# Patient Record
Sex: FEMALE | Race: WHITE | NOT HISPANIC OR LATINO | URBAN - METROPOLITAN AREA
[De-identification: names, ages, dates, MRNs, and addresses within clinical notes are randomized per-mention and may not be internally consistent; named-entity substitution may affect disease eponyms.]

---

## 2024-06-05 ENCOUNTER — TELEPHONE (OUTPATIENT)
Dept: CARDIOLOGY | Facility: HOSPITAL | Age: 84
End: 2024-06-05
Payer: COMMERCIAL

## 2024-06-05 NOTE — TELEPHONE ENCOUNTER
She is scheduled to have Mohs surgery for basal cell carcinoma of her left nose on 07/12/24. She was instructed by her surgeon to discontinue clopidogrel four days prior.  She has shortness of breath while lifting heavy objects that is relieved with laying down or sitting. She is overall feeling good. She does water aerobics and chair exercises for 45 minutes each twice a week without chest pain or shortness of breath. She denies cardiovascular events or hospitalizations since her last appointment. She denies chest pain, palpitations, orthopnea, paroxysmal nocturnal dyspnea, syncope, lower extremity edema, or abnormal bleeding.

## 2024-08-27 ENCOUNTER — OFFICE VISIT (OUTPATIENT)
Dept: CARDIOLOGY | Facility: HOSPITAL | Age: 84
End: 2024-08-27
Payer: COMMERCIAL

## 2024-08-27 VITALS
OXYGEN SATURATION: 97 % | HEIGHT: 60 IN | WEIGHT: 141.5 LBS | HEART RATE: 63 BPM | BODY MASS INDEX: 27.78 KG/M2 | SYSTOLIC BLOOD PRESSURE: 118 MMHG | DIASTOLIC BLOOD PRESSURE: 71 MMHG

## 2024-08-27 DIAGNOSIS — E78.5 HYPERLIPIDEMIA, UNSPECIFIED HYPERLIPIDEMIA TYPE: ICD-10-CM

## 2024-08-27 DIAGNOSIS — I10 PRIMARY HYPERTENSION: Primary | ICD-10-CM

## 2024-08-27 PROCEDURE — 1160F RVW MEDS BY RX/DR IN RCRD: CPT | Performed by: NURSE PRACTITIONER

## 2024-08-27 PROCEDURE — 3078F DIAST BP <80 MM HG: CPT | Performed by: NURSE PRACTITIONER

## 2024-08-27 PROCEDURE — 3074F SYST BP LT 130 MM HG: CPT | Performed by: NURSE PRACTITIONER

## 2024-08-27 PROCEDURE — 93005 ELECTROCARDIOGRAM TRACING: CPT | Performed by: NURSE PRACTITIONER

## 2024-08-27 PROCEDURE — 1036F TOBACCO NON-USER: CPT | Performed by: NURSE PRACTITIONER

## 2024-08-27 PROCEDURE — G2211 COMPLEX E/M VISIT ADD ON: HCPCS | Performed by: NURSE PRACTITIONER

## 2024-08-27 PROCEDURE — 1159F MED LIST DOCD IN RCRD: CPT | Performed by: NURSE PRACTITIONER

## 2024-08-27 PROCEDURE — 99214 OFFICE O/P EST MOD 30 MIN: CPT | Performed by: NURSE PRACTITIONER

## 2024-08-27 RX ORDER — CLOPIDOGREL BISULFATE 75 MG/1
75 TABLET ORAL DAILY
COMMUNITY
Start: 2024-06-08

## 2024-08-27 RX ORDER — ROSUVASTATIN CALCIUM 20 MG/1
20 TABLET, COATED ORAL NIGHTLY
COMMUNITY
Start: 2024-08-16

## 2024-08-27 RX ORDER — ACETAMINOPHEN 500 MG
500 TABLET ORAL EVERY 6 HOURS PRN
COMMUNITY

## 2024-08-27 RX ORDER — FUROSEMIDE 20 MG/1
20 TABLET ORAL DAILY
COMMUNITY
Start: 2024-08-02

## 2024-08-27 RX ORDER — RAMIPRIL 10 MG/1
10 CAPSULE ORAL DAILY
COMMUNITY

## 2024-08-27 RX ORDER — AMLODIPINE BESYLATE 10 MG/1
10 TABLET ORAL DAILY
COMMUNITY
Start: 2024-04-16

## 2024-08-27 RX ORDER — METOPROLOL SUCCINATE 25 MG/1
25 TABLET, EXTENDED RELEASE ORAL 2 TIMES DAILY
COMMUNITY
Start: 2024-08-02

## 2024-08-27 ASSESSMENT — PATIENT HEALTH QUESTIONNAIRE - PHQ9
1. LITTLE INTEREST OR PLEASURE IN DOING THINGS: NOT AT ALL
SUM OF ALL RESPONSES TO PHQ9 QUESTIONS 1 AND 2: 0
2. FEELING DOWN, DEPRESSED OR HOPELESS: NOT AT ALL

## 2024-08-27 ASSESSMENT — ENCOUNTER SYMPTOMS
OCCASIONAL FEELINGS OF UNSTEADINESS: 1
DEPRESSION: 0
LOSS OF SENSATION IN FEET: 0

## 2024-08-27 NOTE — PROGRESS NOTES
Primary Care Physician: Jose Thomson MD  Date of Visit: 08/27/2024  3:30 PM EDT  Location of visit: [facility]     Chief Complaint:   No chief complaint on file.       HPI / Summary:   Lili Dailey is a 83 y.o. female presents for followup. Seen in collaboration with Dr. Alonso. he has no cardiac complaints. She does water aerobics twice a week in addition to chair exercises twice a week without chest pain or shortness of breath. She has chronic lower extremity edema with prolonged standing that is unchanged. She has not had any falls. She ambulates with assistance of a walker.  The patient denies chest pain, shortness of breath, palpitations, lightheadedness, syncope, orthopnea, paroxysmal nocturnal dyspnea, or bleeding problems.              Past Medical History:  Past Medical History:   Diagnosis Date    Central retinal vein occlusion, unspecified eye, stable (CMS-HCC)     Retinal vein occlusion        Past Surgical History:  Past Surgical History:   Procedure Laterality Date    BREAST SURGERY  05/24/2014    Breast Surgery Reduction Procedure    CATARACT EXTRACTION  05/24/2014    Cataract Surgery    MR HEAD ANGIO WO IV CONTRAST  2/23/2016    MR HEAD ANGIO WO IV CONTRAST 2/23/2016 [facility]    MR NECK ANGIO WO IV CONTRAST  2/23/2016    MR NECK ANGIO WO IV CONTRAST 2/23/2016 [facility]    OTHER SURGICAL HISTORY  07/07/2020    Hip replacement partial    TONSILLECTOMY  05/24/2014    Tonsillectomy With Adenoidectomy          Social History:   reports that she has never smoked. She has never used smokeless tobacco. She reports current alcohol use of about 4.0 standard drinks of alcohol per week. She reports that she does not use drugs.     Family History:  family history is not on file.      Allergies:  Allergies   Allergen Reactions    Atorvastatin Other     body aches    Does not agree with her    Codeine Nausea/vomiting    Oxycodone Nausea/vomiting       Outpatient  Medications:  Current Outpatient Medications   Medication Instructions    acetaminophen (TYLENOL EXTRA STRENGTH) 500 mg, oral, Every 6 hours PRN    amLODIPine (NORVASC) 10 mg, oral, Daily    clopidogrel (PLAVIX) 75 mg, oral, Daily    furosemide (LASIX) 20 mg, oral, Daily    metoprolol succinate XL (TOPROL-XL) 25 mg, oral, 2 times daily    multivit-min-mfolate-K-srsa158 800 mcg DFE- 150 mcg tablet     ramipril (ALTACE) 10 mg, oral, Daily    rosuvastatin (CRESTOR) 20 mg, oral, Nightly       Physical Exam:  Vitals:    08/27/24 1517   BP: 118/71   BP Location: Left arm   Patient Position: Sitting   Pulse: 63   SpO2: 97%   Weight: 64.2 kg (141 lb 8 oz)   Height: 1.524 m (5')     Wt Readings from Last 5 Encounters:   08/27/24 64.2 kg (141 lb 8 oz)   08/29/23 66.2 kg (146 lb 0.8 oz)   08/30/22 65.8 kg (145 lb)   08/25/21 66.2 kg (146 lb 0.2 oz)   07/07/20 63.5 kg (140 lb)     Body mass index is 27.63 kg/m².   GENERAL: alert, cooperative, pleasant, in no acute distress  SKIN: warm and dry  NECK: Normal JVD, negative HJR  CARDIAC: Regular rate and rhythm with no rubs, murmurs, or gallops  CHEST: Normal respiratory efforts, lungs clear to auscultation bilaterally.  ABDOMEN: soft, nontender, nondistended  EXTREMITIES: +1 non pitting lower extremity edema, +2 palpable RP and DP pulses bilaterally       Last Labs:  Recent Labs     04/22/19  0813   WBC 5.2   HGB 14.2   HCT 44.9      MCV 92     Recent Labs     04/22/19  0813 10/14/17  1542    135*   K 4.1 3.8    101   BUN 16 15   CREATININE 0.66 0.88     CMP -  Lab Results   Component Value Date    CALCIUM 9.6 04/22/2019    PROT 6.8 04/22/2019    ALBUMIN 4.0 04/22/2019    AST 23 04/22/2019    ALT 23 04/22/2019    ALKPHOS 74 04/22/2019    BILITOT 0.6 04/22/2019       LIPID PANEL -   Lab Results   Component Value Date    CHOL 146 04/22/2019    HDL 65.8 04/22/2019    LDLF 66 04/22/2019    TRIG 72 04/22/2019     Reviewed blood work from 8/21/24- Chemistry: Sodium  "141, Sodium 141, Potassium 4.6, BUN 13, Creat 0.6. AST 25 ALT 24. Lipid profile: Total cholesterol 159, HDL 74, LDL 69, Triglycerides 78. CBC: WBC 5.5, Hemoglobin 14.2, Hematocrit 42.1, Platelet 283      No results found for: \"BNP\", \"HGBA1C\"    Last Cardiology Tests:  ECG:  Obtained and reviewed EKG- normal sinus rhythm HR 66    Echo:  Echo Results:  12/16/2015   CONCLUSIONS:   1. The left ventricular systolic function is mildly decreased with a 45-50% ejection fraction.   2. Spectral Doppler shows an impaired relaxation pattern of left ventricular diastolic filling.   3. The left atrium is mildly dilated.   4. Mildly elevated RVSP.   5. There is global hypokinesis of the left ventricle with minor regional variations.         Cath:      Stress Test:  Stress Results:  No results found for this or any previous visit from the past 365 days.     12/1/62015 Carotid ultrasound  IMPRESSION:  Bilateral mild atherosclerotic changes without evidence for   hemodynamically significant stenoses of the visualized carotid   arteries bilaterally.   No obvious interval change from 6/3/2014.      Assessment/Plan   Problem List Items Addressed This Visit    None  Visit Diagnoses       Primary hypertension    -  Primary    Hyperlipidemia, unspecified hyperlipidemia type        Relevant Orders    ECG 12 lead (Clinic Performed)          In summary Ms. Dailey is a pleasant 83-year-old white female with a past medical history significant for hypertension, hyperlipidemia with prior intolerance to atorvastatin, branch retinal artery occlusion, nonobstructive carotid atherosclerosis, prior TIA, and migraine headaches. She is asymptomatic from a cardiac perspective. Her blood pressure is at goal. Lipid panel is marginal. She does not want to adjust dose of statin at this time. She appears euvolemic on exam. She should continue her current cardiovascular medications. We will see her back in follow-up in 1 year with Dr. Alonso.       Orders:  No " orders of the defined types were placed in this encounter.     Followup Appts:  No future appointments.        ____________________________________________________________  Elisha Youngblood, APRN-CNP  [facility]

## 2024-08-28 LAB
ATRIAL RATE: 66 BPM
P AXIS: 63 DEGREES
P OFFSET: 195 MS
P ONSET: 142 MS
PR INTERVAL: 160 MS
Q ONSET: 222 MS
QRS COUNT: 11 BEATS
QRS DURATION: 84 MS
QT INTERVAL: 402 MS
QTC CALCULATION(BAZETT): 421 MS
QTC FREDERICIA: 415 MS
R AXIS: 53 DEGREES
T AXIS: 38 DEGREES
T OFFSET: 423 MS
VENTRICULAR RATE: 66 BPM

## 2025-07-16 ENCOUNTER — APPOINTMENT (OUTPATIENT)
Dept: CARDIOLOGY | Facility: HOSPITAL | Age: 85
End: 2025-07-16
Payer: COMMERCIAL

## 2025-07-16 ENCOUNTER — HOSPITAL ENCOUNTER (OUTPATIENT)
Facility: HOSPITAL | Age: 85
Setting detail: OBSERVATION
Discharge: HOME | End: 2025-07-17
Attending: EMERGENCY MEDICINE | Admitting: INTERNAL MEDICINE
Payer: COMMERCIAL

## 2025-07-16 ENCOUNTER — APPOINTMENT (OUTPATIENT)
Dept: RADIOLOGY | Facility: HOSPITAL | Age: 85
End: 2025-07-16
Payer: COMMERCIAL

## 2025-07-16 DIAGNOSIS — I25.9 CHEST PAIN DUE TO MYOCARDIAL ISCHEMIA, UNSPECIFIED ISCHEMIC CHEST PAIN TYPE: Primary | ICD-10-CM

## 2025-07-16 DIAGNOSIS — R94.31 ABNORMAL ELECTROCARDIOGRAM (ECG) (EKG): ICD-10-CM

## 2025-07-16 DIAGNOSIS — K44.9 HIATAL HERNIA: ICD-10-CM

## 2025-07-16 DIAGNOSIS — I25.10 ATHEROSCLEROTIC HEART DISEASE OF NATIVE CORONARY ARTERY WITHOUT ANGINA PECTORIS: ICD-10-CM

## 2025-07-16 DIAGNOSIS — R07.89 OTHER CHEST PAIN: ICD-10-CM

## 2025-07-16 LAB
ALBUMIN SERPL BCP-MCNC: 4.9 G/DL (ref 3.4–5)
ALP SERPL-CCNC: 73 U/L (ref 33–136)
ALT SERPL W P-5'-P-CCNC: 28 U/L (ref 7–45)
ANION GAP SERPL CALC-SCNC: 17 MMOL/L (ref 10–20)
AST SERPL W P-5'-P-CCNC: 31 U/L (ref 9–39)
BASOPHILS # BLD AUTO: 0.03 X10*3/UL (ref 0–0.1)
BASOPHILS NFR BLD AUTO: 0.4 %
BILIRUB DIRECT SERPL-MCNC: 0.1 MG/DL (ref 0–0.3)
BILIRUB SERPL-MCNC: 0.6 MG/DL (ref 0–1.2)
BNP SERPL-MCNC: 82 PG/ML (ref 0–99)
BUN SERPL-MCNC: 9 MG/DL (ref 6–23)
CALCIUM SERPL-MCNC: 10.2 MG/DL (ref 8.6–10.3)
CARDIAC TROPONIN I PNL SERPL HS: 6 NG/L (ref 0–13)
CARDIAC TROPONIN I PNL SERPL HS: 6 NG/L (ref 0–13)
CHLORIDE SERPL-SCNC: 99 MMOL/L (ref 98–107)
CHOLEST SERPL-MCNC: 166 MG/DL (ref 0–199)
CHOLESTEROL/HDL RATIO: 1.8
CO2 SERPL-SCNC: 26 MMOL/L (ref 21–32)
CREAT SERPL-MCNC: 0.72 MG/DL (ref 0.5–1.05)
EGFRCR SERPLBLD CKD-EPI 2021: 83 ML/MIN/1.73M*2
EOSINOPHIL # BLD AUTO: 0.02 X10*3/UL (ref 0–0.4)
EOSINOPHIL NFR BLD AUTO: 0.3 %
ERYTHROCYTE [DISTWIDTH] IN BLOOD BY AUTOMATED COUNT: 13.2 % (ref 11.5–14.5)
GLUCOSE SERPL-MCNC: 119 MG/DL (ref 74–99)
HCT VFR BLD AUTO: 48.3 % (ref 36–46)
HDLC SERPL-MCNC: 93 MG/DL
HGB BLD-MCNC: 15.7 G/DL (ref 12–16)
IMM GRANULOCYTES # BLD AUTO: 0.03 X10*3/UL (ref 0–0.5)
IMM GRANULOCYTES NFR BLD AUTO: 0.4 % (ref 0–0.9)
LDLC SERPL CALC-MCNC: 58 MG/DL
LYMPHOCYTES # BLD AUTO: 1.32 X10*3/UL (ref 0.8–3)
LYMPHOCYTES NFR BLD AUTO: 17.6 %
MAGNESIUM SERPL-MCNC: 2.38 MG/DL (ref 1.6–2.4)
MCH RBC QN AUTO: 29.7 PG (ref 26–34)
MCHC RBC AUTO-ENTMCNC: 32.5 G/DL (ref 32–36)
MCV RBC AUTO: 91 FL (ref 80–100)
MONOCYTES # BLD AUTO: 0.41 X10*3/UL (ref 0.05–0.8)
MONOCYTES NFR BLD AUTO: 5.5 %
NEUTROPHILS # BLD AUTO: 5.68 X10*3/UL (ref 1.6–5.5)
NEUTROPHILS NFR BLD AUTO: 75.8 %
NON HDL CHOLESTEROL: 73 MG/DL (ref 0–149)
NRBC BLD-RTO: 0 /100 WBCS (ref 0–0)
PLATELET # BLD AUTO: 266 X10*3/UL (ref 150–450)
POTASSIUM SERPL-SCNC: 4.1 MMOL/L (ref 3.5–5.3)
PROT SERPL-MCNC: 8 G/DL (ref 6.4–8.2)
RBC # BLD AUTO: 5.29 X10*6/UL (ref 4–5.2)
SODIUM SERPL-SCNC: 138 MMOL/L (ref 136–145)
TRIGL SERPL-MCNC: 77 MG/DL (ref 0–149)
TSH SERPL-ACNC: 1.5 MIU/L (ref 0.44–3.98)
VLDL: 15 MG/DL (ref 0–40)
WBC # BLD AUTO: 7.5 X10*3/UL (ref 4.4–11.3)

## 2025-07-16 PROCEDURE — 2500000004 HC RX 250 GENERAL PHARMACY W/ HCPCS (ALT 636 FOR OP/ED): Performed by: NURSE PRACTITIONER

## 2025-07-16 PROCEDURE — 99223 1ST HOSP IP/OBS HIGH 75: CPT | Performed by: NURSE PRACTITIONER

## 2025-07-16 PROCEDURE — 84443 ASSAY THYROID STIM HORMONE: CPT | Performed by: NURSE PRACTITIONER

## 2025-07-16 PROCEDURE — 99285 EMERGENCY DEPT VISIT HI MDM: CPT | Mod: 25 | Performed by: EMERGENCY MEDICINE

## 2025-07-16 PROCEDURE — 80053 COMPREHEN METABOLIC PANEL: CPT | Performed by: EMERGENCY MEDICINE

## 2025-07-16 PROCEDURE — 93005 ELECTROCARDIOGRAM TRACING: CPT

## 2025-07-16 PROCEDURE — 84484 ASSAY OF TROPONIN QUANT: CPT | Performed by: EMERGENCY MEDICINE

## 2025-07-16 PROCEDURE — 80061 LIPID PANEL: CPT | Performed by: NURSE PRACTITIONER

## 2025-07-16 PROCEDURE — 83880 ASSAY OF NATRIURETIC PEPTIDE: CPT | Performed by: EMERGENCY MEDICINE

## 2025-07-16 PROCEDURE — 83036 HEMOGLOBIN GLYCOSYLATED A1C: CPT | Mod: AHULAB | Performed by: NURSE PRACTITIONER

## 2025-07-16 PROCEDURE — 2500000002 HC RX 250 W HCPCS SELF ADMINISTERED DRUGS (ALT 637 FOR MEDICARE OP, ALT 636 FOR OP/ED): Performed by: NURSE PRACTITIONER

## 2025-07-16 PROCEDURE — 71045 X-RAY EXAM CHEST 1 VIEW: CPT

## 2025-07-16 PROCEDURE — 2500000001 HC RX 250 WO HCPCS SELF ADMINISTERED DRUGS (ALT 637 FOR MEDICARE OP): Performed by: NURSE PRACTITIONER

## 2025-07-16 PROCEDURE — G0378 HOSPITAL OBSERVATION PER HR: HCPCS

## 2025-07-16 PROCEDURE — 84075 ASSAY ALKALINE PHOSPHATASE: CPT | Performed by: EMERGENCY MEDICINE

## 2025-07-16 PROCEDURE — 85025 COMPLETE CBC W/AUTO DIFF WBC: CPT | Performed by: EMERGENCY MEDICINE

## 2025-07-16 PROCEDURE — 71045 X-RAY EXAM CHEST 1 VIEW: CPT | Performed by: RADIOLOGY

## 2025-07-16 PROCEDURE — 36415 COLL VENOUS BLD VENIPUNCTURE: CPT | Performed by: EMERGENCY MEDICINE

## 2025-07-16 PROCEDURE — 96372 THER/PROPH/DIAG INJ SC/IM: CPT | Performed by: NURSE PRACTITIONER

## 2025-07-16 PROCEDURE — 83735 ASSAY OF MAGNESIUM: CPT | Performed by: NURSE PRACTITIONER

## 2025-07-16 RX ORDER — AMLODIPINE BESYLATE 10 MG/1
10 TABLET ORAL DAILY
Status: DISCONTINUED | OUTPATIENT
Start: 2025-07-16 | End: 2025-07-17 | Stop reason: HOSPADM

## 2025-07-16 RX ORDER — ENOXAPARIN SODIUM 100 MG/ML
40 INJECTION SUBCUTANEOUS EVERY 24 HOURS
Status: DISCONTINUED | OUTPATIENT
Start: 2025-07-16 | End: 2025-07-17 | Stop reason: HOSPADM

## 2025-07-16 RX ORDER — CLOPIDOGREL BISULFATE 75 MG/1
75 TABLET ORAL DAILY
Status: DISCONTINUED | OUTPATIENT
Start: 2025-07-16 | End: 2025-07-17 | Stop reason: HOSPADM

## 2025-07-16 RX ORDER — LISINOPRIL 20 MG/1
20 TABLET ORAL DAILY
Status: DISCONTINUED | OUTPATIENT
Start: 2025-07-16 | End: 2025-07-17 | Stop reason: HOSPADM

## 2025-07-16 RX ORDER — FUROSEMIDE 20 MG/1
20 TABLET ORAL DAILY
Status: DISCONTINUED | OUTPATIENT
Start: 2025-07-16 | End: 2025-07-17 | Stop reason: HOSPADM

## 2025-07-16 RX ORDER — ROSUVASTATIN CALCIUM 20 MG/1
20 TABLET, COATED ORAL NIGHTLY
Status: DISCONTINUED | OUTPATIENT
Start: 2025-07-16 | End: 2025-07-17 | Stop reason: HOSPADM

## 2025-07-16 RX ORDER — METOPROLOL SUCCINATE 25 MG/1
25 TABLET, EXTENDED RELEASE ORAL 2 TIMES DAILY
Status: DISCONTINUED | OUTPATIENT
Start: 2025-07-16 | End: 2025-07-17 | Stop reason: HOSPADM

## 2025-07-16 RX ADMIN — ROSUVASTATIN 20 MG: 20 TABLET, FILM COATED ORAL at 21:23

## 2025-07-16 RX ADMIN — METOPROLOL SUCCINATE 25 MG: 25 TABLET, EXTENDED RELEASE ORAL at 21:23

## 2025-07-16 RX ADMIN — LISINOPRIL 20 MG: 20 TABLET ORAL at 21:23

## 2025-07-16 RX ADMIN — CLOPIDOGREL 75 MG: 75 TABLET ORAL at 21:23

## 2025-07-16 RX ADMIN — ENOXAPARIN SODIUM 40 MG: 100 INJECTION SUBCUTANEOUS at 21:23

## 2025-07-16 SDOH — ECONOMIC STABILITY: FOOD INSECURITY: WITHIN THE PAST 12 MONTHS, THE FOOD YOU BOUGHT JUST DIDN'T LAST AND YOU DIDN'T HAVE MONEY TO GET MORE.: NEVER TRUE

## 2025-07-16 SDOH — SOCIAL STABILITY: SOCIAL INSECURITY: ARE THERE ANY APPARENT SIGNS OF INJURIES/BEHAVIORS THAT COULD BE RELATED TO ABUSE/NEGLECT?: NO

## 2025-07-16 SDOH — SOCIAL STABILITY: SOCIAL INSECURITY: ABUSE: ADULT

## 2025-07-16 SDOH — SOCIAL STABILITY: SOCIAL INSECURITY: WERE YOU ABLE TO COMPLETE ALL THE BEHAVIORAL HEALTH SCREENINGS?: YES

## 2025-07-16 SDOH — SOCIAL STABILITY: SOCIAL INSECURITY: WITHIN THE LAST YEAR, HAVE YOU BEEN AFRAID OF YOUR PARTNER OR EX-PARTNER?: NO

## 2025-07-16 SDOH — SOCIAL STABILITY: SOCIAL INSECURITY: DO YOU FEEL UNSAFE GOING BACK TO THE PLACE WHERE YOU ARE LIVING?: NO

## 2025-07-16 SDOH — SOCIAL STABILITY: SOCIAL INSECURITY: HAS ANYONE EVER THREATENED TO HURT YOUR FAMILY OR YOUR PETS?: NO

## 2025-07-16 SDOH — ECONOMIC STABILITY: INCOME INSECURITY: IN THE PAST 12 MONTHS HAS THE ELECTRIC, GAS, OIL, OR WATER COMPANY THREATENED TO SHUT OFF SERVICES IN YOUR HOME?: NO

## 2025-07-16 SDOH — ECONOMIC STABILITY: FOOD INSECURITY: WITHIN THE PAST 12 MONTHS, YOU WORRIED THAT YOUR FOOD WOULD RUN OUT BEFORE YOU GOT THE MONEY TO BUY MORE.: NEVER TRUE

## 2025-07-16 SDOH — SOCIAL STABILITY: SOCIAL INSECURITY: WITHIN THE LAST YEAR, HAVE YOU BEEN HUMILIATED OR EMOTIONALLY ABUSED IN OTHER WAYS BY YOUR PARTNER OR EX-PARTNER?: NO

## 2025-07-16 SDOH — SOCIAL STABILITY: SOCIAL INSECURITY
WITHIN THE LAST YEAR, HAVE YOU BEEN KICKED, HIT, SLAPPED, OR OTHERWISE PHYSICALLY HURT BY YOUR PARTNER OR EX-PARTNER?: NO

## 2025-07-16 SDOH — SOCIAL STABILITY: SOCIAL INSECURITY: DO YOU FEEL ANYONE HAS EXPLOITED OR TAKEN ADVANTAGE OF YOU FINANCIALLY OR OF YOUR PERSONAL PROPERTY?: NO

## 2025-07-16 SDOH — SOCIAL STABILITY: SOCIAL INSECURITY
WITHIN THE LAST YEAR, HAVE YOU BEEN RAPED OR FORCED TO HAVE ANY KIND OF SEXUAL ACTIVITY BY YOUR PARTNER OR EX-PARTNER?: NO

## 2025-07-16 SDOH — SOCIAL STABILITY: SOCIAL INSECURITY: HAVE YOU HAD THOUGHTS OF HARMING ANYONE ELSE?: NO

## 2025-07-16 SDOH — SOCIAL STABILITY: SOCIAL INSECURITY: ARE YOU OR HAVE YOU BEEN THREATENED OR ABUSED PHYSICALLY, EMOTIONALLY, OR SEXUALLY BY ANYONE?: NO

## 2025-07-16 SDOH — SOCIAL STABILITY: SOCIAL INSECURITY: DOES ANYONE TRY TO KEEP YOU FROM HAVING/CONTACTING OTHER FRIENDS OR DOING THINGS OUTSIDE YOUR HOME?: NO

## 2025-07-16 ASSESSMENT — ACTIVITIES OF DAILY LIVING (ADL)
GROOMING: INDEPENDENT
DRESSING YOURSELF: INDEPENDENT
TOILETING: INDEPENDENT
ADEQUATE_TO_COMPLETE_ADL: YES
WALKS IN HOME: INDEPENDENT
FEEDING YOURSELF: INDEPENDENT
HEARING - LEFT EAR: FUNCTIONAL
ASSISTIVE_DEVICE: EYEGLASSES;WALKER
BATHING: INDEPENDENT
JUDGMENT_ADEQUATE_SAFELY_COMPLETE_DAILY_ACTIVITIES: YES
PATIENT'S MEMORY ADEQUATE TO SAFELY COMPLETE DAILY ACTIVITIES?: YES
HEARING - RIGHT EAR: FUNCTIONAL
LACK_OF_TRANSPORTATION: NO

## 2025-07-16 ASSESSMENT — COGNITIVE AND FUNCTIONAL STATUS - GENERAL
WALKING IN HOSPITAL ROOM: A LITTLE
DAILY ACTIVITIY SCORE: 24
STANDING UP FROM CHAIR USING ARMS: A LITTLE
MOBILITY SCORE: 17
PATIENT BASELINE BEDBOUND: NO
MOVING FROM LYING ON BACK TO SITTING ON SIDE OF FLAT BED WITH BEDRAILS: A LITTLE
CLIMB 3 TO 5 STEPS WITH RAILING: A LOT
MOVING TO AND FROM BED TO CHAIR: A LITTLE
TURNING FROM BACK TO SIDE WHILE IN FLAT BAD: A LITTLE

## 2025-07-16 ASSESSMENT — PAIN SCALES - GENERAL
PAINLEVEL_OUTOF10: 0 - NO PAIN
PAINLEVEL_OUTOF10: 2

## 2025-07-16 ASSESSMENT — LIFESTYLE VARIABLES
AUDIT-C TOTAL SCORE: 3
AUDIT-C TOTAL SCORE: 3
HOW MANY STANDARD DRINKS CONTAINING ALCOHOL DO YOU HAVE ON A TYPICAL DAY: 1 OR 2
HOW OFTEN DO YOU HAVE A DRINK CONTAINING ALCOHOL: 2-3 TIMES A WEEK
SKIP TO QUESTIONS 9-10: 1
HOW OFTEN DO YOU HAVE 6 OR MORE DRINKS ON ONE OCCASION: NEVER

## 2025-07-16 ASSESSMENT — PAIN - FUNCTIONAL ASSESSMENT
PAIN_FUNCTIONAL_ASSESSMENT: 0-10
PAIN_FUNCTIONAL_ASSESSMENT: 0-10

## 2025-07-16 ASSESSMENT — PATIENT HEALTH QUESTIONNAIRE - PHQ9
SUM OF ALL RESPONSES TO PHQ9 QUESTIONS 1 & 2: 0
1. LITTLE INTEREST OR PLEASURE IN DOING THINGS: NOT AT ALL
2. FEELING DOWN, DEPRESSED OR HOPELESS: NOT AT ALL

## 2025-07-16 ASSESSMENT — HEART SCORE
RISK FACTORS: 1-2 RISK FACTORS
HISTORY: HIGHLY SUSPICIOUS
AGE: 65+
TROPONIN: LESS THAN OR EQUAL TO NORMAL LIMIT
ECG: NON-SPECIFIC REPOLARIZATION DISTURBANCE
HEART SCORE: 6

## 2025-07-16 ASSESSMENT — PAIN DESCRIPTION - LOCATION: LOCATION: CHEST

## 2025-07-16 NOTE — H&P
"History Of Present Illness  Lili Dailey is a 84 y.o. female  with a pmhx of CAD, HTN, HLD, and CVA who presents to [facility] for evaluation of Chest pain. Patient reports left sided chest pain radiating to her shoulders, she states the pain was \"heavy\"; severity 8/10 at it's worst. She originally attributed the pain to indigestion, she attempted antacids and tylenol without relief prompting her to the ER for evaluation. She states the pain resolved after baby ASA administered by EMS. She reports associated SOB and nausea without vomiting which has also resolved. She states up until today, she was at her baseline health status. She is currently denying any chest pain.     ED course: Afebrile, hypertensive at 154/77 otherwise HDS. Labs unremarkable, troponin negative x 2.     10 point ROS has been performed and is negative except for what is stated above.   Past Medical History  She has a past medical history of Central retinal vein occlusion, unspecified eye, stable, Hypertension, and Stroke (Multi).    Surgical History  She has a past surgical history that includes Cataract extraction (05/24/2014); Breast surgery (05/24/2014); Tonsillectomy (05/24/2014); Other surgical history (07/07/2020); MR angio neck wo IV contrast (2/23/2016); and MR angio head wo IV contrast (2/23/2016).     Social History  She reports that she has never smoked. She has never used smokeless tobacco. She reports current alcohol use of about 4.0 standard drinks of alcohol per week. She reports that she does not use drugs.    Family History  Family History[1]     Allergies  Atorvastatin, Codeine, and Oxycodone    Review of Systems     Physical Exam  Vitals reviewed.   HENT:      Head: Normocephalic.     Eyes:      Conjunctiva/sclera: Conjunctivae normal.     Pulmonary:      Effort: Pulmonary effort is normal.     Neurological:      Mental Status: She is alert and oriented to person, place, and time.     Psychiatric:         Mood and Affect: " Mood normal.     Physical exam limited 2/2 virtual visit      Last Recorded Vitals  /75 (BP Location: Right arm, Patient Position: Lying)   Pulse 80   Temp 36.6 °C (97.9 °F) (Temporal)   Resp 16   Wt 63.5 kg (140 lb)   SpO2 94%     Relevant Results  Scheduled medications  Scheduled Medications[2]  Continuous medications  Continuous Medications[3]  PRN medications  PRN Medications[4]    Results for orders placed or performed during the hospital encounter of 07/16/25 (from the past 24 hours)   CBC and Auto Differential   Result Value Ref Range    WBC 7.5 4.4 - 11.3 x10*3/uL    nRBC 0.0 0.0 - 0.0 /100 WBCs    RBC 5.29 (H) 4.00 - 5.20 x10*6/uL    Hemoglobin 15.7 12.0 - 16.0 g/dL    Hematocrit 48.3 (H) 36.0 - 46.0 %    MCV 91 80 - 100 fL    MCH 29.7 26.0 - 34.0 pg    MCHC 32.5 32.0 - 36.0 g/dL    RDW 13.2 11.5 - 14.5 %    Platelets 266 150 - 450 x10*3/uL    Neutrophils % 75.8 40.0 - 80.0 %    Immature Granulocytes %, Automated 0.4 0.0 - 0.9 %    Lymphocytes % 17.6 13.0 - 44.0 %    Monocytes % 5.5 2.0 - 10.0 %    Eosinophils % 0.3 0.0 - 6.0 %    Basophils % 0.4 0.0 - 2.0 %    Neutrophils Absolute 5.68 (H) 1.60 - 5.50 x10*3/uL    Immature Granulocytes Absolute, Automated 0.03 0.00 - 0.50 x10*3/uL    Lymphocytes Absolute 1.32 0.80 - 3.00 x10*3/uL    Monocytes Absolute 0.41 0.05 - 0.80 x10*3/uL    Eosinophils Absolute 0.02 0.00 - 0.40 x10*3/uL    Basophils Absolute 0.03 0.00 - 0.10 x10*3/uL   Basic metabolic panel   Result Value Ref Range    Glucose 119 (H) 74 - 99 mg/dL    Sodium 138 136 - 145 mmol/L    Potassium 4.1 3.5 - 5.3 mmol/L    Chloride 99 98 - 107 mmol/L    Bicarbonate 26 21 - 32 mmol/L    Anion Gap 17 10 - 20 mmol/L    Urea Nitrogen 9 6 - 23 mg/dL    Creatinine 0.72 0.50 - 1.05 mg/dL    eGFR 83 >60 mL/min/1.73m*2    Calcium 10.2 8.6 - 10.3 mg/dL   Hepatic function panel   Result Value Ref Range    Albumin 4.9 3.4 - 5.0 g/dL    Bilirubin, Total 0.6 0.0 - 1.2 mg/dL    Bilirubin, Direct 0.1 0.0 - 0.3  "mg/dL    Alkaline Phosphatase 73 33 - 136 U/L    ALT 28 7 - 45 U/L    AST 31 9 - 39 U/L    Total Protein 8.0 6.4 - 8.2 g/dL   B-Type Natriuretic Peptide   Result Value Ref Range    BNP 82 0 - 99 pg/mL   Troponin I, High Sensitivity, Initial   Result Value Ref Range    Troponin I, High Sensitivity 6 0 - 13 ng/L   Troponin, High Sensitivity, 1 Hour   Result Value Ref Range    Troponin I, High Sensitivity 6 0 - 13 ng/L     XR chest 1 view  Result Date: 7/16/2025  Interpreted By:  Harpreet Shirley, STUDY: XR CHEST 1 VIEW;  7/16/2025 5:27 pm   INDICATION: Signs/Symptoms:cp.     COMPARISON: None.   ACCESSION NUMBER(S): [id number]   ORDERING CLINICIAN: ÁNGEL DUMONT   FINDINGS:         CARDIOMEDIASTINAL SILHOUETTE: Cardiomediastinal silhouette is normal in size and configuration.   LUNGS: The lungs are mildly hyperinflated but clear.   ABDOMEN: There is a large hiatal hernia. No remarkable upper abdominal findings.   BONES: No acute osseous changes.       1.  No evidence of acute cardiopulmonary process. 2. Large hiatal hernia.       MACRO: None   Signed by: Harpreet Shirley 7/16/2025 5:46 PM Dictation workstation:   [id number]               Assessment/Plan   Assessment & Plan    Lili Dailey is a 84 y.o. female  with a pmhx of CAD, HTN, HLD, and CVA who presents to [facility] for evaluation of Chest pain. Patient reports left sided chest pain radiating to her shoulders, she states the pain was \"heavy\"; severity 8/10 at it's worst. She originally attributed the pain to indigestion, she attempted antacids and tylenol without relief prompting her to the ER for evaluation. She states the pain resolved after baby ASA administered by EMS. She reports associated SOB and nausea without vomiting which has also resolved. She states up until today, she was at her baseline health status. She is currently denying any chest pain.     #Chest pain  #HLD  # hx CVA  # CAD   # Hiatal hernia   -admit to tele obs  -consult cardiology, " appreciate input   -troponin negative x 2  -Echo   -lipid panel, A1c, TSH   -NPO after midnight   -continue home medications    -follow up GI outpatient     #DVT ppx  -Lovenox  -SCDs    Virtual or Telephone Consent    An interactive audio and video telecommunication system which permits real time communications between the patient (at the originating site) and provider (at the distant site) was utilized to provide this telehealth service.   Verbal consent was requested and obtained from Lili Dailey on this date, 07/16/25 for a telehealth visit and the patient's location was confirmed at the time of the visit.     Debbi Hathaway, APRN-CNP         [1] No family history on file.  [2] [3] [4]

## 2025-07-16 NOTE — ED TRIAGE NOTES
Pt to ED from home via EMS with reports of chest pain that started today after lunch (around 1pm). PT states pain has improved with the baby Asprin in the squad.     Hx: HTN, Stroke (2016), on Plavix

## 2025-07-17 ENCOUNTER — APPOINTMENT (OUTPATIENT)
Dept: CARDIOLOGY | Facility: HOSPITAL | Age: 85
End: 2025-07-17
Payer: COMMERCIAL

## 2025-07-17 ENCOUNTER — APPOINTMENT (OUTPATIENT)
Dept: RADIOLOGY | Facility: HOSPITAL | Age: 85
End: 2025-07-17
Payer: COMMERCIAL

## 2025-07-17 VITALS
HEART RATE: 78 BPM | BODY MASS INDEX: 27.48 KG/M2 | RESPIRATION RATE: 16 BRPM | SYSTOLIC BLOOD PRESSURE: 138 MMHG | DIASTOLIC BLOOD PRESSURE: 67 MMHG | TEMPERATURE: 96.7 F | OXYGEN SATURATION: 98 % | HEIGHT: 60 IN | WEIGHT: 140 LBS

## 2025-07-17 LAB
AORTIC VALVE PEAK VELOCITY: 1.4 M/S
ATRIAL RATE: 78 BPM
AV PEAK GRADIENT: 8 MMHG
EJECTION FRACTION APICAL 4 CHAMBER: 45.9
EJECTION FRACTION: 53 %
EST. AVERAGE GLUCOSE BLD GHB EST-MCNC: 111 MG/DL
HBA1C MFR BLD: 5.5 % (ref ?–5.7)
LEFT ATRIUM VOLUME AREA LENGTH INDEX BSA: 30.2 ML/M2
LEFT VENTRICLE INTERNAL DIMENSION DIASTOLE: 4.24 CM (ref 3.5–6)
P AXIS: 49 DEGREES
P OFFSET: 196 MS
P ONSET: 140 MS
PR INTERVAL: 160 MS
Q ONSET: 220 MS
QRS COUNT: 13 BEATS
QRS DURATION: 78 MS
QT INTERVAL: 394 MS
QTC CALCULATION(BAZETT): 449 MS
QTC FREDERICIA: 430 MS
R AXIS: 57 DEGREES
RIGHT VENTRICLE FREE WALL PEAK S': 16.55 CM/S
RIGHT VENTRICLE PEAK SYSTOLIC PRESSURE: 30 MMHG
T AXIS: -11 DEGREES
T OFFSET: 417 MS
TRICUSPID ANNULAR PLANE SYSTOLIC EXCURSION: 2.4 CM
VENTRICULAR RATE: 78 BPM

## 2025-07-17 PROCEDURE — 2500000001 HC RX 250 WO HCPCS SELF ADMINISTERED DRUGS (ALT 637 FOR MEDICARE OP): Performed by: NURSE PRACTITIONER

## 2025-07-17 PROCEDURE — 2500000001 HC RX 250 WO HCPCS SELF ADMINISTERED DRUGS (ALT 637 FOR MEDICARE OP): Performed by: HOSPITALIST

## 2025-07-17 PROCEDURE — 93018 CV STRESS TEST I&R ONLY: CPT | Performed by: INTERNAL MEDICINE

## 2025-07-17 PROCEDURE — 93306 TTE W/DOPPLER COMPLETE: CPT | Performed by: INTERNAL MEDICINE

## 2025-07-17 PROCEDURE — 96374 THER/PROPH/DIAG INJ IV PUSH: CPT | Mod: 59

## 2025-07-17 PROCEDURE — 78452 HT MUSCLE IMAGE SPECT MULT: CPT

## 2025-07-17 PROCEDURE — 93005 ELECTROCARDIOGRAM TRACING: CPT

## 2025-07-17 PROCEDURE — 2500000004 HC RX 250 GENERAL PHARMACY W/ HCPCS (ALT 636 FOR OP/ED): Performed by: NURSE PRACTITIONER

## 2025-07-17 PROCEDURE — 3430000001 HC RX 343 DIAGNOSTIC RADIOPHARMACEUTICALS: Performed by: NURSE PRACTITIONER

## 2025-07-17 PROCEDURE — A9502 TC99M TETROFOSMIN: HCPCS | Performed by: NURSE PRACTITIONER

## 2025-07-17 PROCEDURE — 78452 HT MUSCLE IMAGE SPECT MULT: CPT | Performed by: INTERNAL MEDICINE

## 2025-07-17 PROCEDURE — 93017 CV STRESS TEST TRACING ONLY: CPT

## 2025-07-17 PROCEDURE — 99238 HOSP IP/OBS DSCHRG MGMT 30/<: CPT

## 2025-07-17 PROCEDURE — G0378 HOSPITAL OBSERVATION PER HR: HCPCS

## 2025-07-17 PROCEDURE — 2500000004 HC RX 250 GENERAL PHARMACY W/ HCPCS (ALT 636 FOR OP/ED): Performed by: INTERNAL MEDICINE

## 2025-07-17 PROCEDURE — 96376 TX/PRO/DX INJ SAME DRUG ADON: CPT | Mod: 59

## 2025-07-17 PROCEDURE — 96375 TX/PRO/DX INJ NEW DRUG ADDON: CPT | Mod: 59

## 2025-07-17 PROCEDURE — 99222 1ST HOSP IP/OBS MODERATE 55: CPT | Performed by: INTERNAL MEDICINE

## 2025-07-17 PROCEDURE — 93306 TTE W/DOPPLER COMPLETE: CPT

## 2025-07-17 PROCEDURE — 93016 CV STRESS TEST SUPVJ ONLY: CPT | Performed by: INTERNAL MEDICINE

## 2025-07-17 RX ORDER — REGADENOSON 0.08 MG/ML
0.4 INJECTION, SOLUTION INTRAVENOUS
Status: COMPLETED | OUTPATIENT
Start: 2025-07-17 | End: 2025-07-17

## 2025-07-17 RX ORDER — ACETAMINOPHEN 325 MG/1
650 TABLET ORAL EVERY 4 HOURS PRN
Status: DISCONTINUED | OUTPATIENT
Start: 2025-07-17 | End: 2025-07-17 | Stop reason: HOSPADM

## 2025-07-17 RX ORDER — PANTOPRAZOLE SODIUM 40 MG/10ML
40 INJECTION, POWDER, LYOPHILIZED, FOR SOLUTION INTRAVENOUS DAILY
Status: DISCONTINUED | OUTPATIENT
Start: 2025-07-17 | End: 2025-07-17 | Stop reason: HOSPADM

## 2025-07-17 RX ORDER — PANTOPRAZOLE SODIUM 40 MG/1
40 TABLET, DELAYED RELEASE ORAL DAILY
Qty: 30 TABLET | Refills: 0 | Status: SHIPPED | OUTPATIENT
Start: 2025-07-17 | End: 2025-08-16

## 2025-07-17 RX ORDER — AMINOPHYLLINE 25 MG/ML
125 INJECTION, SOLUTION INTRAVENOUS AS NEEDED
Status: CANCELLED | OUTPATIENT
Start: 2025-07-17

## 2025-07-17 RX ADMIN — TETROFOSMIN 36 MILLICURIE: 0.23 INJECTION, POWDER, LYOPHILIZED, FOR SOLUTION INTRAVENOUS at 13:34

## 2025-07-17 RX ADMIN — LISINOPRIL 20 MG: 20 TABLET ORAL at 09:46

## 2025-07-17 RX ADMIN — AMLODIPINE BESYLATE 10 MG: 10 TABLET ORAL at 09:46

## 2025-07-17 RX ADMIN — METOPROLOL SUCCINATE 25 MG: 25 TABLET, EXTENDED RELEASE ORAL at 09:46

## 2025-07-17 RX ADMIN — TETROFOSMIN 11.8 MILLICURIE: 0.23 INJECTION, POWDER, LYOPHILIZED, FOR SOLUTION INTRAVENOUS at 11:29

## 2025-07-17 RX ADMIN — FUROSEMIDE 20 MG: 20 TABLET ORAL at 09:46

## 2025-07-17 RX ADMIN — PANTOPRAZOLE SODIUM 40 MG: 40 INJECTION, POWDER, FOR SOLUTION INTRAVENOUS at 09:46

## 2025-07-17 RX ADMIN — ACETAMINOPHEN 650 MG: 325 TABLET ORAL at 04:40

## 2025-07-17 RX ADMIN — REGADENOSON 0.4 MG: 0.08 INJECTION, SOLUTION INTRAVENOUS at 13:48

## 2025-07-17 ASSESSMENT — PAIN - FUNCTIONAL ASSESSMENT
PAIN_FUNCTIONAL_ASSESSMENT: 0-10
PAIN_FUNCTIONAL_ASSESSMENT: 0-10

## 2025-07-17 ASSESSMENT — PAIN SCALES - GENERAL
PAINLEVEL_OUTOF10: 3
PAINLEVEL_OUTOF10: 0 - NO PAIN
PAINLEVEL_OUTOF10: 0 - NO PAIN

## 2025-07-17 ASSESSMENT — PAIN DESCRIPTION - LOCATION: LOCATION: OTHER (COMMENT)

## 2025-07-17 ASSESSMENT — PAIN DESCRIPTION - DESCRIPTORS: DESCRIPTORS: ACHING

## 2025-07-17 NOTE — CARE PLAN
The patient's goals for the shift include      The clinical goals for the shift include pt to remain safe and HDS this shift      Problem: Pain - Adult  Goal: Verbalizes/displays adequate comfort level or baseline comfort level  Outcome: Progressing     Problem: Safety - Adult  Goal: Free from fall injury  Outcome: Progressing     Problem: Discharge Planning  Goal: Discharge to home or other facility with appropriate resources  Outcome: Progressing     Problem: Chronic Conditions and Co-morbidities  Goal: Patient's chronic conditions and co-morbidity symptoms are monitored and maintained or improved  Outcome: Progressing     Problem: Nutrition  Goal: Nutrient intake appropriate for maintaining nutritional needs  Outcome: Progressing

## 2025-07-17 NOTE — CONSULTS
Inpatient consult to Cardiology  Consult performed by: YANET Zepeda-CNP  Consult ordered by: YANET Alan-CNP  Reason for consult: chest pain        History Of Present Illness:    Lili Dailey is a 84 y.o. female with PMHx significant for hypertension, hyperlipidemia with prior intolerance to atorvastatin now on rosuvastatin, cardiomyopathy with LVEF 45-50% in 2015,   branch retinal artery occlusion, nonobstructive carotid atherosclerosis, prior TIA, and migraine headaches who presented to [facility] 7/16/25 ED via EMS with c/o cp.     Patient reports that yesterday about 1 hour after eating lunch, she developed sharp midsternal/left sided chest discomfort radiating to her bilateral shoulders and upper back associated with sob. She rated pain as 8/10. She took 2 antacids and 2 tylenol with no relief. She notified nurse at her Assisted Living facility who called EMS. Given 3 ASA 81 mg PO via EMS with some relief. Chest pain resolved throughout the evening. No current cp. She reports occasional mid sternal pain after eating relieved with antacid. No LH/dizziness. No syncope or palpitations. No c/o sob, BONILLA prior to yesterday. She has lived in Assisted Living for the past 3 years due to impaired gait, hx frequent falls. She is a retired nurse.       ED course:   Vitals: T 36.6/HR 77/RR 18//75. CBC unremarkable, BMP unremarkable, K 4.1, BNP 81, Hs HsTrop negative x2. LDL 58. TSH wnl. HbgA1c 5.5. ECG SR HR 78, TWI in leads III, aVF, Chest x-ray negative for acute cardiopulmonary process, +large hiatal hernia. Placed under Observation Status.       Tele:SR    Outpatient cardiac medications: Amlodipine 10 mg daily, Clopidogrel 75 mg daily, Furosemide 75 mg daily, Metoprolol Succinate 25 mg daily, Ramipril 10 mg daily, Rosuvastatin 20 mg daily    Social history: Denies smoking, alcohol abuse, or illicit drug use.     Pertinent cardiac family history: No sudden cardiac death or premature CAD.  "      Past Cardiology Tests (Last 3 Years):  EKG:                      Echo:  Echo Results:  12/16/2015   CONCLUSIONS:   1. The left ventricular systolic function is mildly decreased with a 45-50% ejection fraction.   2. Spectral Doppler shows an impaired relaxation pattern of left ventricular diastolic filling.   3. The left atrium is mildly dilated.   4. Mildly elevated RVSP.   5. There is global hypokinesis of the left ventricle with minor regional variations.    Ejection Fractions:  No results found for: \"EF\"  Cath:  No results found for this or any previous visit.    Stress Test:  No results found for this or any previous visit.    Cardiac Imaging:  No results found for this or any previous visit.    12/1/62015 Carotid ultrasound  IMPRESSION:  Bilateral mild atherosclerotic changes without evidence for   hemodynamically significant stenoses of the visualized carotid   arteries bilaterally.   No obvious interval change from 6/3/2014.  Past Medical History:  She has a past medical history of Central retinal vein occlusion, unspecified eye, stable, Hypertension, and Stroke (Multi).    Past Surgical History:  She has a past surgical history that includes Cataract extraction (05/24/2014); Breast surgery (05/24/2014); Tonsillectomy (05/24/2014); Other surgical history (07/07/2020); MR angio neck wo IV contrast (2/23/2016); and MR angio head wo IV contrast (2/23/2016).      Social History:  She reports that she has never smoked. She has never used smokeless tobacco. She reports current alcohol use of about 4.0 standard drinks of alcohol per week. She reports that she does not use drugs.    Family History:  Family History[1]     Allergies:  Atorvastatin, Codeine, and Oxycodone    ROS:  10 point review of systems including (Constitutional, Eyes, ENMT, Respiratory, Cardiac, Gastrointestinal, Neurological, Psychiatric, and Hematologic) was performed and is otherwise negative.    Objective Data:  Last Recorded " "Vitals:  Vitals:    25 1857 25 1900 25 0022 25 0434   BP:  154/75 141/72 142/80   BP Location:  Right arm Right arm Right arm   Patient Position:  Lying Lying Sitting   Pulse: 77 80 68 77   Resp:    Temp:   36.3 °C (97.3 °F) 36 °C (96.8 °F)   TempSrc:   Temporal Temporal   SpO2:  94% 94% 97%   Weight:       Height:         Medical Gas Therapy: None (Room air)  Weight  Av.5 kg (140 lb)  Min: 63.5 kg (140 lb)  Max: 63.5 kg (140 lb)    LABS:  CMP:  Results from last 7 days   Lab Units 25  1627   SODIUM mmol/L 138   POTASSIUM mmol/L 4.1   CHLORIDE mmol/L 99   CO2 mmol/L 26   ANION GAP mmol/L 17   BUN mg/dL 9   CREATININE mg/dL 0.72   EGFR mL/min/1.73m*2 83   MAGNESIUM mg/dL 2.38   ALBUMIN g/dL 4.9   ALT U/L 28   AST U/L 31   BILIRUBIN TOTAL mg/dL 0.6     CBC:  Results from last 7 days   Lab Units 25  1627   WBC AUTO x10*3/uL 7.5   HEMOGLOBIN g/dL 15.7   HEMATOCRIT % 48.3*   PLATELETS AUTO x10*3/uL 266   MCV fL 91     COAG:     ABO: No results found for: \"ABO\"  HEME/ENDO:  Results from last 7 days   Lab Units 25  1627   TSH mIU/L 1.50   HEMOGLOBIN A1C % 5.5      CARDIAC:   Results from last 7 days   Lab Units 25  1725 25  1627   TROPHS ng/L 6 6   BNP pg/mL  --  82      Results from last 7 days   Lab Units 25  1627   HEMOGLOBIN A1C % 5.5   LDL CALC mg/dL 58   VLDL mg/dL 15   CHOLESTEROL/HDL RATIO  1.8        Last I/O:  No intake or output data in the 24 hours ending 25 0831  Net IO Since Admission: No IO data has been entered for this period [25 0831]      Imaging Results:  ECG 12 lead  Result Date: 2025  Normal sinus rhythm ST & T wave abnormality, consider inferior ischemia Abnormal ECG When compared with ECG of 27-AUG-2024 15:17, T wave inversion now evident in Inferior leads    XR chest 1 view  Result Date: 2025  Interpreted By:  Harpreet Shirley, STUDY: XR CHEST 1 VIEW;  2025 5:27 pm   INDICATION: Signs/Symptoms:cp. "     COMPARISON: None.   ACCESSION NUMBER(S): [id number]   ORDERING CLINICIAN: ÁNGEL DUMONT   FINDINGS:         CARDIOMEDIASTINAL SILHOUETTE: Cardiomediastinal silhouette is normal in size and configuration.   LUNGS: The lungs are mildly hyperinflated but clear.   ABDOMEN: There is a large hiatal hernia. No remarkable upper abdominal findings.   BONES: No acute osseous changes.       1.  No evidence of acute cardiopulmonary process. 2. Large hiatal hernia.       MACRO: None   Signed by: Harpreet Shirley 7/16/2025 5:46 PM Dictation workstation:   [id number]      Inpatient Medications:  Scheduled Medications[2]  PRN Medications[3]  Continuous Medications[4]    Outpatient Medications:  Current Outpatient Medications   Medication Instructions    acetaminophen (TYLENOL EXTRA STRENGTH) 500 mg, oral, Every 6 hours PRN    amLODIPine (NORVASC) 10 mg, oral, Daily    clopidogrel (PLAVIX) 75 mg, oral, Daily    furosemide (LASIX) 20 mg, oral, Daily    metoprolol succinate XL (TOPROL-XL) 25 mg, oral, 2 times daily    multivit-min-mfolate-[id number] 800 mcg DFE- 150 mcg tablet     ramipril (ALTACE) 10 mg, oral, Daily    rosuvastatin (CRESTOR) 20 mg, oral, Nightly       Physical Exam:  General:  Elderly female. Patient is awake, alert, and oriented.  Patient is in no acute distress.  HEENT:  Pupils equal and reactive.  Normocephalic.  Moist mucosa.    Neck:  No thyromegaly.  Normal Jugular Venous Pressure.  Cardiovascular:  Regular rate and rhythm.  Normal S1 and S2.  Pulmonary:  Clear to auscultation bilaterally.  Abdomen:  Soft. Non-tender.   Non-distended.  Positive bowel sounds.  Lower Extremities:  2+ pedal pulses. No LE edema.  Neurologic:  Cranial nerves intact.  No focal deficit.   Skin: Skin warm and dry, normal skin turgor.   Psychiatric: Normal affect.     Assessment/Plan   Lili Dailey is a 84 y.o. female with PMHx significant for hypertension, hyperlipidemia with prior intolerance to atorvastatin, branch  retinal artery occlusion, nonobstructive carotid atherosclerosis, prior TIA, and migraine headaches who presented to [facility] 7/16/25 ED via EMS with c/o cp.     Outpatient cardiac medications: Amlodipine 10 mg daily, Clopidogrel 75 mg daily, Furosemide 75 mg daily, Metoprolol Succinate 25 mg daily, Ramipril 10 mg daily, Rosuvastatin 20 mg daily  Cardiologist: Dr. Alonso/Anabel Youngblood CNP    #Chest Pain  -HsTrop negative x2. ECG SR HR 78, TWI in leads III, aVF (present on past EKG, but now more pronounced)  -1 hour after eating, no relief with antacid, +relief after receiving 162 mg ASA PO  -? May be 2/2 to her large hiatal hernia on x-ray; however given abnormal EKG, risk factors, cardiomyopathy, recommend ischemic work up  -Continue statin, Plavix (for hx TIA, left retinal artery occlusion)    #Cardiomyopathy  -TTE 2015 with LVEF 45-50%, diastolic dysfx, mildly dilated LA, mildly elevated RVSP; no updated TTE available  -Will repeat TTE  -On beta blocker, ACEi    #HTN  -Controlled on current regimen    #HLD, LDL at goal 58. Continue Rosuvastatin.     Plan/Recs:   -TTE pending. Further initiation & optimization of GDMT, pending results.   -Nuclear SPECT stress test for ischemic evaluation.   -Continue Plavix, Rosuvastatin, other CV medications.   -Follow up with Dr. Alonso/Anabel Youngblood CNP within 4 weeks on discharge    Code Status:  No Order          YANET Zepeda-CNP           [1] No family history on file.  [2]   Scheduled medications   Medication Dose Route Frequency    amLODIPine  10 mg oral Daily    clopidogrel  75 mg oral Daily    enoxaparin  40 mg subcutaneous q24h    furosemide  20 mg oral Daily    lisinopril  20 mg oral Daily    metoprolol succinate XL  25 mg oral BID    pantoprazole  40 mg intravenous Daily    perflutren lipid microspheres  0.5-10 mL of dilution intravenous Once in imaging    perflutren protein A microsphere  0.5 mL intravenous Once in imaging    rosuvastatin  20 mg oral Nightly     sulfur hexafluoride microsphr  2 mL intravenous Once in imaging   [3]   PRN medications   Medication    acetaminophen   [4]   Continuous Medications   Medication Dose Last Rate

## 2025-07-17 NOTE — PROGRESS NOTES
07/17/25 1630   Discharge Planning   Care Facility Name [facility]     SW called Vera CAMILO and left VM in regards to barriers to return. SW will follow.

## 2025-07-17 NOTE — PROGRESS NOTES
07/17/25 1520   Discharge Planning   Support Systems Children;Family members   Assistance Needed None   Type of Residence Assisted living   Care Facility Name [facility]   Home or Post Acute Services None   Expected Discharge Disposition Home   Does the patient need discharge transport arranged? Yes   [facility] [employer] coordination needed? Yes     Plan per Medical/Surgical team: [place] gonsalves, ua with culture  Status: observation  Payor source: [facility]  Discharge disposition: return to [facility]  Potential Barriers:   ADOD:

## 2025-07-17 NOTE — NURSING NOTE

## 2025-07-17 NOTE — PROGRESS NOTES
07/17/25 1520   Discharge Planning   Support Systems Children;Family members   Assistance Needed None   Type of Residence Assisted living   Care Facility Name [facility]   Home or Post Acute Services None   Expected Discharge Disposition Home   Does the patient need discharge transport arranged? Yes   [employer] [employer] coordination needed? Yes     Plan per Medical/Surgical team:  ECHO, cardio consult, stress test  Status: observation  Payor source: [facility]  Discharge disposition: return to [facility]  Potential Barriers:   ADOD: 7/17/25

## 2025-07-17 NOTE — DISCHARGE INSTRUCTIONS
[facility] Observation Unit Discharge Instructions  You came to the hospital with chest pain and were admitted for observation and further care.   You were evaluated by our Cardiology Team, and echocardiogram and stress test were conducted and both were negative.   You will be discharged home on pantoprazole once a day. Continue this until seen by your PCP.      Your discharge plan is to go home to recover.     Please see your primary care doctor in 1 week for follow-up.   An appointment referral has been requested for you but may you need to call your doctors office or use [facility] to schedule.     Additionally, referrals have been ordered for you to follow up with Cardiology Dr Alonso/ Anabel Youngblood CNP,  within 4 weeks on discharge   The office or scheduling department should call you to arrange an appointment in the next week or two.      For any issues or concerns with appointments, call the  scheduling line at [phone] or the providers office directly.        See the attached information for education about any new medications and the condition(s) you were treated for.  Your medications may have changed so pay close attention to the list given to you at discharge and ask any questions you have before leaving the hospital.

## 2025-07-17 NOTE — ED PROVIDER NOTES
Emergency Department Provider Note       HPI: []  It is here white female with a history of hypertension, dyslipidemia, on Plavix and Alison statin comes in with chest pain.  She states she went for lunch with her friends and when she came home she developed midsternal chest pain which went to her both shoulders her jaw became red diaphoretic and short of breath called EMS they gave her an aspirin current she is pain-free.  This never happened happened before.  She has no history of CAD in the past.  No history of coronary stents.    Past history: Hypertension, dyslipidemia, cataracts  Social: Denies current tobacco alcohol.  REVIEW OF SYSTEMS:    GENERAL.: No weight loss, fatigue, anorexia, insomnia, fever.    EYES: No vision loss, double vision, drainage, eye pain.    ENT: No pharyngitis, dry mouth.    CARDIOPULMONARY: Positive for chest pain, palpitations, syncope, near syncope. No shortness of breath, cough, hemoptysis.    GI: No abdominal pain, change in bowel habits, melena, hematemesis, hematochezia, nausea, vomiting, diarrhea.    : No discharge, dysuria, frequency, urgency, hematuria.    MS: No limb pain, joint pain, joint swelling.    SKIN: No rashes.    PSYCH: No depression, anxiety, suicidality, homicidality.    Review of systems is otherwise negative unless stated above or in history of present illness.  Social history, family history, allergies reviewed.  PHYSICAL EXAM:    GENERAL: Vitals noted, no distress. Alert and oriented  x 3. Non-toxic.      EENT: TMs clear. Posterior oropharynx unremarkable. No meningismus. No LAD.     NECK: Supple. Nontender. No midline tenderness.     CARDIAC: Regular, rate, rhythm. No murmurs rubs or gallops. No JVD    PULMONARY: Lungs clear bilaterally with good aeration. No wheezes rales or rhonchi. No respiratory distress.     ABDOMEN: Soft, nonsurgical. Nontender. No peritoneal signs. Normoactive bowel sounds. No pulsatile masses.     EXTREMITIES: No peripheral edema.  Negative Homans bilaterally, no cords.  2+ bounding pulses well-perfused.    SKIN: No rash. Intact.     NEURO: No focal neurologic deficits, NIH score of 0. Cranial nerves normal as tested from II through XII.     MEDICAL DECISION MAKING:    EKG on my interpretation shows a normal sinus rhythm normal axis rate in the mid 70s with some inverted T waves in the inferior leads old and new Q waves which were not there before    CBC with differential chemistry LFTs are normal troponin times negative chest x-ray negative.    Treatment in the ED: IV established placed on a cardiac monitor.    ED course: Patient maribel asymptomatic remains afebrile normotensive no tachycardia or hypoxia    Impression: Chest pain  Plan set MDM: 84-year-old female with a history of hypertension dyslipidemia [employer] comes in with chest pain since history is kind of concerning pain description which could be due to unstable angina EKG has any changes troponin is negative reassuring however given her concerning history patient would benefit from inpatient cardiac evaluation including other CT coronaries versus a cardiac stress test patient will be hospital with again my suspicion for dissection of pulm embolism low.                                                     Kapil Haas MD  07/16/25 2045       Kapil Haas MD  07/16/25 2045

## 2025-07-17 NOTE — DISCHARGE SUMMARY
"Discharge Diagnosis  Chest pain due to myocardial ischemia, unspecified ischemic chest pain type     Issues Requiring Follow-Up  Chest pain    Discharge Meds     Medication List      START taking these medications     pantoprazole 40 mg EC tablet; Commonly known as: ProtoNix; Take 1 tablet   (40 mg) by mouth once daily. Do not crush, chew, or split.     CONTINUE taking these medications     amLODIPine 10 mg tablet; Commonly known as: Norvasc   clopidogrel 75 mg tablet; Commonly known as: Plavix   furosemide 20 mg tablet; Commonly known as: Lasix   metoprolol succinate XL 25 mg 24 hr tablet; Commonly known as: Toprol-XL   multivit-min-mfolate-[id number] 800 mcg DFE- 150 mcg tablet   ramipril 10 mg capsule; Commonly known as: Altace   rosuvastatin 20 mg tablet; Commonly known as: Crestor   Tylenol Extra Strength 500 mg tablet; Generic drug: acetaminophen     Test Results Pending At Discharge  Pending Labs       No current pending labs.          Hospital Course     Lili Dailey is a 84 y.o. female  with a pmhx of CAD, HTN, HLD, and CVA who presents to [facility] for evaluation of Chest pain. Patient reports left sided chest pain radiating to her shoulders, she states the pain was \"heavy\"; severity 8/10 at it's worst. She originally attributed the pain to indigestion, she attempted antacids and tylenol without relief prompting her to the ER for evaluation. She states the pain resolved after baby ASA administered by EMS. She reports associated SOB and nausea without vomiting which has also resolved. She states up until today, she was at her baseline health status. She is currently denying any chest pain.     Outpatient cardiac medications: Amlodipine 10 mg daily, Clopidogrel 75 mg daily, Furosemide 75 mg daily, Metoprolol Succinate 25 mg daily, Ramipril 10 mg daily, Rosuvastatin 20 mg daily  Cardiologist: Dr. Alonso/Anabel Youngblood, CNP     #Chest Pain  -Hs Trop negative x2. ECG SR HR 78, TWI in leads III, aVF (present on " past EKG, but now more pronounced)  -1 hour after eating, no relief with antacid, +relief after receiving 162 mg ASA PO  -? May be 2/2 to her large hiatal hernia on x-ray  - Starting Pantoprazole once daily  - Continue statin, Plavix (for hx TIA, left retinal artery occlusion)  - 7/17/25 Nuclear SPECT stress test NEGATIVE for ischemic evaluation.     #Cardiomyopathy  -TTE 2015 with LVEF 45-50%, diastolic dysfx, mildly dilated LA, mildly elevated RVSP; no updated TTE available  -TTE 7/17/2025: Left ventricular ejection fraction is low normal by visual estimate at 50-55%. Left ventricular diastolic filling is indeterminate. There is normal right ventricular global systolic function. The Doppler estimated RVSP is within normal limits at 30 mmHg. Mild aortic valve regurgitation.  -On beta blocker, ACEi     #HTN  -Controlled on current regimen     #HLD, LDL at goal 58. Continue Rosuvastatin.      Follow up with Dr. Alonso/Anabel Youngblood CNP within 4 weeks on discharge, follow up with PCP within 1 week.    Discharge: home    Pertinent Physical Exam At Time of Discharge  Physical Exam  Constitutional: NAD, pt alert and cooperative  Eyes: no icterus  ENMT: mucous membranes moist, no lesions seen  Head/Neck: Neck supple  Respiratory/Thorax: CTA bilaterally, non-labored breathing, no cough, on RA  Cardiovascular: Regular rate and rhythm, no murmurs heard  Gastrointestinal: Nondistended, soft, non-tender, BS present x 4  : no Key, no SP discomfort  Musculoskeletal: ROM intact, no joint swelling  Extremities: normal extremities, no edema  Neurological: A&O x 3  Skin: Warm and dry, no lesions, no rashes    Outpatient Follow-Up  Future Appointments   Date Time Provider Department Center   9/23/2025  3:00 PM Samm Alonso MD [id number]     Patient seen at bedside. Events from the last visit reviewed. Discussed with staff. Results of tests and investigations from last visit reviewed and discussed with patient/Family. Electronic  chart on [facility] reviewed. Input / Recommendations  from consultants appreciated and reviewed and agreed with.    discharge summary and profile completed. medications reviewed and discussed with patient and family.  scripts completed and signed.    total discharge time in excess of 30 minutes.    Upon discharge: Patient HDS. VSS. Chest pain free. Anxious to be discharged home.   Joyce Aleman, YANET-CNP

## 2025-07-17 NOTE — CARE PLAN
The patient's goals for the shift include  maintain safety.    The clinical goals for the shift include pt safety and remain HDS      Problem: Pain - Adult  Goal: Verbalizes/displays adequate comfort level or baseline comfort level  Outcome: Progressing     Problem: Safety - Adult  Goal: Free from fall injury  Outcome: Progressing     Problem: Discharge Planning  Goal: Discharge to home or other facility with appropriate resources  Outcome: Progressing     Problem: Chronic Conditions and Co-morbidities  Goal: Patient's chronic conditions and co-morbidity symptoms are monitored and maintained or improved  Outcome: Progressing     Problem: Nutrition  Goal: Nutrient intake appropriate for maintaining nutritional needs  Outcome: Progressing

## 2025-07-18 LAB
ATRIAL RATE: 62 BPM
P AXIS: 44 DEGREES
P OFFSET: 191 MS
P ONSET: 136 MS
PR INTERVAL: 164 MS
Q ONSET: 218 MS
QRS COUNT: 11 BEATS
QRS DURATION: 86 MS
QT INTERVAL: 418 MS
QTC CALCULATION(BAZETT): 424 MS
QTC FREDERICIA: 422 MS
R AXIS: 15 DEGREES
T AXIS: -3 DEGREES
T OFFSET: 427 MS
VENTRICULAR RATE: 62 BPM

## 2025-07-29 LAB
ATRIAL RATE: 78 BPM
P AXIS: 49 DEGREES
P OFFSET: 196 MS
P ONSET: 140 MS
PR INTERVAL: 160 MS
Q ONSET: 220 MS
QRS COUNT: 13 BEATS
QRS DURATION: 78 MS
QT INTERVAL: 394 MS
QTC CALCULATION(BAZETT): 449 MS
QTC FREDERICIA: 430 MS
R AXIS: 57 DEGREES
T AXIS: -11 DEGREES
T OFFSET: 417 MS
VENTRICULAR RATE: 78 BPM
